# Patient Record
Sex: FEMALE | Race: WHITE | Employment: OTHER | URBAN - METROPOLITAN AREA
[De-identification: names, ages, dates, MRNs, and addresses within clinical notes are randomized per-mention and may not be internally consistent; named-entity substitution may affect disease eponyms.]

---

## 2022-01-05 ENCOUNTER — APPOINTMENT (OUTPATIENT)
Dept: GENERAL RADIOLOGY | Age: 87
End: 2022-01-05
Attending: STUDENT IN AN ORGANIZED HEALTH CARE EDUCATION/TRAINING PROGRAM
Payer: MEDICARE

## 2022-01-05 ENCOUNTER — HOSPITAL ENCOUNTER (EMERGENCY)
Age: 87
Discharge: HOME | End: 2022-01-05
Attending: STUDENT IN AN ORGANIZED HEALTH CARE EDUCATION/TRAINING PROGRAM
Payer: MEDICARE

## 2022-01-05 VITALS
SYSTOLIC BLOOD PRESSURE: 157 MMHG | BODY MASS INDEX: 24.54 KG/M2 | OXYGEN SATURATION: 99 % | RESPIRATION RATE: 20 BRPM | WEIGHT: 125 LBS | DIASTOLIC BLOOD PRESSURE: 74 MMHG | HEIGHT: 60 IN | TEMPERATURE: 97.9 F | HEART RATE: 85 BPM

## 2022-01-05 DIAGNOSIS — M25.571 ACUTE RIGHT ANKLE PAIN: ICD-10-CM

## 2022-01-05 DIAGNOSIS — M25.471 RIGHT ANKLE SWELLING: Primary | ICD-10-CM

## 2022-01-05 DIAGNOSIS — Z74.09 IMPAIRED MOBILITY: ICD-10-CM

## 2022-01-05 PROCEDURE — 73610 X-RAY EXAM OF ANKLE: CPT

## 2022-01-05 PROCEDURE — 99281 EMR DPT VST MAYX REQ PHY/QHP: CPT

## 2022-01-05 RX ORDER — AMLODIPINE BESYLATE 10 MG/1
10 TABLET ORAL DAILY
COMMUNITY

## 2022-01-05 RX ORDER — UREA 10 %
100 LOTION (ML) TOPICAL DAILY
COMMUNITY

## 2022-01-05 NOTE — ED TRIAGE NOTES
Pt arrives c/o right ankle swelling and inability to walk that started last night. Pt denies any injury.

## 2022-01-05 NOTE — PROGRESS NOTES
Care Management [facility] - SS[facility] NP asked CM to follow up with patient and her family to give them information. Met with patient, her son, her daughter-in-law Ignacio Gaines. Explained [facility] available to transport patient at 9:00 PM for $60 and transport chair is available on [employer] for $113. They have secured a wheelchair and now feel they will be able to take her up the steps themselves. They will keep the number for [facility] in case they need it for the future. [facility] let [facility] know we will not need transport for patient.      KAJAL Claros

## 2022-01-05 NOTE — SENIOR SERVICES NOTE
Senior services consult received and appreciated. Collaboration with Dr. Katy Cotton, patient and family needing assistance. Per MD, most likely, sprained ankle, no equipment in home. I met with patient and family member in hallway near radiology. Kusum Nice is with the patient, she states that the patient is her mother-in-law. The patient physically lives in [place], [place] with her grandson, however they have lost power and so they went to pick her up. Upon transferring patient, Catarina Garvey realized that was not able to ambulate due to an injury from last night. Catarina Garvey expresses that she has too many steps in the front of the home and 3 steps to get in the back door. However to get into the back door, there is a long gravel walkway to maneuver. Patient with known HX of Dementia and \"can be stubborn at times. \"    Options discussed:  -Transport chair and patient sit on bottom scooting up the 3 steps with assistance. Catarina Garvey states that she is trying to locate equipment available by neighbors.   -Call the non-emergency line once they get to the home and ask for assistance into the home.  -[facility] about $60.   -Transport chair not available at [facility], located on [address] approx $113. Roxana's Address:  [address] [place] [address]   [place] [address] [facility]    All information relayed to Dr. Katy Cotton and Gavino Feliciano CM to further assist.    Lucia Meyers [address], NP  [facility]  5:32 PM  [phone]

## 2022-01-05 NOTE — ED PROVIDER NOTES
80 y.o. female prior hx dementia presenting today with R ankle pain. First noticed it today. Does not recall any injury. Pain is to the lateral aspect of the ankle with associated swelling and some bruising. No redness or heat. No fever. No calf pain or swelling. No history of gout. Taking Tylenol and ibuprofen for the pain. Daughter was concerned because they have trouble getting in the house due to pain today. Past Medical History:   Diagnosis Date    Dementia ([address]     Hypertension        History reviewed. No pertinent surgical history. History reviewed. No pertinent family history. Social History     Socioeconomic History    Marital status:      Spouse name: Not on file    Number of children: Not on file    Years of education: Not on file    Highest education level: Not on file   Occupational History    Not on file   Tobacco Use    Smoking status: Not on file    Smokeless tobacco: Not on file   Substance and Sexual Activity    Alcohol use: Not on file    Drug use: Not on file    Sexual activity: Not on file   Other Topics Concern    Not on file   Social History Narrative    Not on file     Social Determinants of Health     Financial Resource Strain:     Difficulty of Paying Living Expenses: Not on file   Food Insecurity:     Worried About Running Out of Food in the Last Year: Not on file    Noman of Food in the Last Year: Not on file   Transportation Needs:     Lack of Transportation (Medical): Not on file    Lack of Transportation (Non-Medical):  Not on file   Physical Activity:     Days of Exercise per Week: Not on file    Minutes of Exercise per Session: Not on file   Stress:     Feeling of Stress : Not on file   Social Connections:     Frequency of Communication with Friends and Family: Not on file    Frequency of Social Gatherings with Friends and Family: Not on file    Attends Rastafari Services: Not on file   [employer] of Clubs or Organizations: Not on file    Attends Club or Organization Meetings: Not on file    Marital Status: Not on file   Intimate Partner Violence:     Fear of Current or Ex-Partner: Not on file    Emotionally Abused: Not on file    Physically Abused: Not on file    Sexually Abused: Not on file   Housing Stability:     Unable to Pay for Housing in the Last Year: Not on file    Number of [place] in the Last Year: Not on file    Unstable Housing in the Last Year: Not on file         ALLERGIES: Patient has no known allergies. Review of Systems   Constitutional: Negative for fever. Musculoskeletal: Positive for arthralgias and joint swelling. Skin: Negative for wound. Neurological: Negative for numbness. Vitals:    01/05/22 1624   BP: (!) 157/74   Pulse: 85   Resp: 20   Temp: 97.9 °F (36.6 °C)   SpO2: 99%   Weight: 56.7 kg (125 lb)   Height: 5' (1.524 m)            Physical Exam  Constitutional:       General: She is not in acute distress. Appearance: She is well-developed. HENT:      Head: Normocephalic. Eyes:      Conjunctiva/sclera: Conjunctivae normal.   Pulmonary:      Effort: Pulmonary effort is normal. No respiratory distress. Musculoskeletal:         General: Normal range of motion. Cervical back: Normal range of motion. Comments: Swelling and tenderness along the lateral malleolus and ATF region of the right ankle. No pain with micro articulation of the ankle. Does not appear to have an ankle effusion. No erythema or heat. Palpable DP and PT pulse. Skin:     General: Skin is warm and dry. Capillary Refill: Capillary refill takes less than 2 seconds. Psychiatric:         Behavior: Behavior normal.          MDM       Procedures                 X-ray shows no acute process    Senior services and case management asked to see the patient regarding helping her get into her house safely. 68-year-old female here with right ankle pain and swelling.   She does not appear of a septic joint based on my exam findings. X-ray shows no acute process such as fracture. Possible sprain although unclear mechanism. Given an air splint, case management to assist with helping patient get back in the house. ICD-10-CM ICD-9-CM    1. Right ankle swelling  M25.471 719.07    2. Acute right ankle pain  M25.571 719.47      338.19    3.  Impaired mobility  Z74.09 799.89      LUCRECIA Lopez, DO

## 2023-05-15 RX ORDER — AMLODIPINE BESYLATE 10 MG/1
10 TABLET ORAL DAILY
COMMUNITY